# Patient Record
Sex: FEMALE | Race: WHITE | Employment: STUDENT | URBAN - METROPOLITAN AREA
[De-identification: names, ages, dates, MRNs, and addresses within clinical notes are randomized per-mention and may not be internally consistent; named-entity substitution may affect disease eponyms.]

---

## 2017-12-15 ENCOUNTER — OFFICE VISIT (OUTPATIENT)
Dept: INTERNAL MEDICINE CLINIC | Facility: CLINIC | Age: 27
End: 2017-12-15

## 2017-12-15 VITALS
BODY MASS INDEX: 25.68 KG/M2 | WEIGHT: 136 LBS | HEIGHT: 61 IN | SYSTOLIC BLOOD PRESSURE: 112 MMHG | HEART RATE: 72 BPM | DIASTOLIC BLOOD PRESSURE: 75 MMHG | TEMPERATURE: 98 F | RESPIRATION RATE: 18 BRPM

## 2017-12-15 DIAGNOSIS — L21.9 SEBORRHEIC DERMATITIS: Primary | ICD-10-CM

## 2017-12-15 PROCEDURE — 99202 OFFICE O/P NEW SF 15 MIN: CPT | Performed by: INTERNAL MEDICINE

## 2017-12-15 PROCEDURE — 99212 OFFICE O/P EST SF 10 MIN: CPT | Performed by: INTERNAL MEDICINE

## 2017-12-15 NOTE — PROGRESS NOTES
HPI:    Patient ID: Mauricio Marsh is a 32year old female. HPI    Establish care  Patient hasn't been seen here in many years. She has been seeing gyne for 2 deliveries. Has 11and 3year olds.      Rash  Patient with rash to back of her neck for las Disorder Father      per NG: diverticulitis   • Thyroid Disorder Father      per NG: hypothyroidism   • Ulcerative Colitis Mother    • Diabetes Maternal Grandmother    • Hypertension Maternal Grandmother    • High Cholesterol Maternal Grandmother    • Thyr

## 2017-12-15 NOTE — PATIENT INSTRUCTIONS
ASSESSMENT/PLAN:   Diagnoses and all orders for this visit:    Seborrheic dermatitis    moderate rash, will give ointment twice daily until better.